# Patient Record
Sex: FEMALE | Race: BLACK OR AFRICAN AMERICAN | NOT HISPANIC OR LATINO | URBAN - METROPOLITAN AREA
[De-identification: names, ages, dates, MRNs, and addresses within clinical notes are randomized per-mention and may not be internally consistent; named-entity substitution may affect disease eponyms.]

---

## 2018-10-24 ENCOUNTER — EMERGENCY (EMERGENCY)
Facility: HOSPITAL | Age: 63
LOS: 1 days | Discharge: HOME | End: 2018-10-24
Attending: EMERGENCY MEDICINE | Admitting: EMERGENCY MEDICINE
Payer: MEDICAID

## 2018-10-24 VITALS
HEART RATE: 70 BPM | OXYGEN SATURATION: 100 % | RESPIRATION RATE: 15 BRPM | SYSTOLIC BLOOD PRESSURE: 191 MMHG | DIASTOLIC BLOOD PRESSURE: 96 MMHG

## 2018-10-24 VITALS
RESPIRATION RATE: 16 BRPM | TEMPERATURE: 100 F | HEART RATE: 72 BPM | SYSTOLIC BLOOD PRESSURE: 224 MMHG | OXYGEN SATURATION: 100 % | DIASTOLIC BLOOD PRESSURE: 133 MMHG

## 2018-10-24 PROCEDURE — 99283 EMERGENCY DEPT VISIT LOW MDM: CPT

## 2018-10-24 RX ORDER — HYDRALAZINE HCL 50 MG
100 TABLET ORAL ONCE
Qty: 0 | Refills: 0 | Status: COMPLETED | OUTPATIENT
Start: 2018-10-24 | End: 2018-10-24

## 2018-10-24 RX ADMIN — Medication 100 MILLIGRAM(S): at 18:44

## 2018-10-24 NOTE — ED ADULT NURSE NOTE - NSIMPLEMENTINTERV_GEN_ALL_ED
Implemented All Universal Safety Interventions:  [place] to call system. Call bell, personal items and telephone within reach. Instruct patient to call for assistance. Room bathroom lighting operational. Non-slip footwear when patient is off stretcher. Physically safe environment: no spills, clutter or unnecessary equipment. Stretcher in lowest position, wheels locked, appropriate side rails in place.

## 2018-10-24 NOTE — ED PROVIDER NOTE - OBJECTIVE STATEMENT
62 y/o female pmh htn sent in by PMD for htn. Pt went to her pmd today for routine visit and was found to have htn 220s/120s and was sent to the ER. Pt admits to not taking her afternoon dose of hydralazine as she was planning on taking it after her visit. Pt denies chest pain, sob, palpitations, diaphoresis, n/v/d, numbness, tingling, weakness, dizziness, syncope, change in vision, headache, slurred speech, fever or chills.

## 2018-10-24 NOTE — ED ADULT NURSE NOTE - OBJECTIVE STATEMENT
received pt to  4 c/o high BP, pt was at PCP office and he sent her here, pt A&Ox4, denies HA/CP/SOB/dizzy, hypertensive as documented, no line/labs at this time, pt medicated as ordered, will monitor.

## 2021-01-13 NOTE — ED ADULT NURSE NOTE - PRO INTERPRETER NEED 2
1. Caller name: Matthew on [facility]          2. Phone number: [phone]    3. Matthew sent a refill request for Lexapro to be sent over.     
6 month supply sent.    
English

## 2025-06-16 NOTE — ED PROVIDER NOTE - ATTENDING CONTRIBUTION TO CARE
AJM: Patient seen with PA and agree with above note. 64 y/o female pmh htn sent in by PMD for htn. Pt went to her pmd today for routine visit and was found to have htn 220s/120s and was sent to the ER. Pt admits to not taking her afternoon dose of hydralazine as she was planning on taking it after her visit. Pt denies chest pain, sob, palpitations, diaphoresis, n/v/d, numbness, tingling, weakness, dizziness, syncope, change in vision, headache, slurred speech, fever or chills. Pt with asymptomatic htn. will give hydralazine as she missed her dose this afternoon. as long as bp decreases and pt remains asymptomatic will dc
Previously Declined (within the last year)